# Patient Record
Sex: FEMALE | Race: WHITE | NOT HISPANIC OR LATINO | Employment: OTHER | URBAN - METROPOLITAN AREA
[De-identification: names, ages, dates, MRNs, and addresses within clinical notes are randomized per-mention and may not be internally consistent; named-entity substitution may affect disease eponyms.]

---

## 2022-02-18 ENCOUNTER — NEW PATIENT (OUTPATIENT)
Dept: URBAN - METROPOLITAN AREA CLINIC 47 | Facility: CLINIC | Age: 87
End: 2022-02-18

## 2022-02-18 DIAGNOSIS — H49.21: ICD-10-CM

## 2022-02-18 PROCEDURE — 92015 DETERMINE REFRACTIVE STATE: CPT

## 2022-02-18 PROCEDURE — 92004 COMPRE OPH EXAM NEW PT 1/>: CPT

## 2022-02-18 ASSESSMENT — VISUAL ACUITY
OS_CC: J10
OD_CC: J12
OS_CC: 20/50+2
OD_CC: 20/50

## 2022-02-18 ASSESSMENT — TONOMETRY
OD_IOP_MMHG: 9
OS_IOP_MMHG: 9

## 2022-02-18 NOTE — PATIENT DISCUSSION
Pathogenesis and natural history discussed and patient's questions answered. Will defer MRI for now. Recommend patching for now.